# Patient Record
Sex: MALE | Race: WHITE
[De-identification: names, ages, dates, MRNs, and addresses within clinical notes are randomized per-mention and may not be internally consistent; named-entity substitution may affect disease eponyms.]

---

## 2019-08-09 PROBLEM — Z00.00 ENCOUNTER FOR PREVENTIVE HEALTH EXAMINATION: Status: ACTIVE | Noted: 2019-08-09

## 2019-08-22 ENCOUNTER — RECORD ABSTRACTING (OUTPATIENT)
Age: 54
End: 2019-08-22

## 2019-08-22 DIAGNOSIS — E78.5 HYPERLIPIDEMIA, UNSPECIFIED: ICD-10-CM

## 2019-08-22 DIAGNOSIS — Z87.39 PERSONAL HISTORY OF OTHER DISEASES OF THE MUSCULOSKELETAL SYSTEM AND CONNECTIVE TISSUE: ICD-10-CM

## 2019-08-22 RX ORDER — AMLODIPINE BESYLATE 5 MG/1
5 TABLET ORAL DAILY
Refills: 0 | Status: ACTIVE | COMMUNITY
Start: 2019-08-22

## 2019-08-22 RX ORDER — COLCHICINE 0.6 MG/1
0.6 TABLET ORAL DAILY
Refills: 0 | Status: ACTIVE | COMMUNITY
Start: 2019-08-22

## 2019-08-22 RX ORDER — LISINOPRIL 5 MG/1
5 TABLET ORAL DAILY
Qty: 90 | Refills: 1 | Status: ACTIVE | COMMUNITY
Start: 2019-08-22

## 2019-08-22 RX ORDER — PNV NO.95/FERROUS FUM/FOLIC AC 28MG-0.8MG
1000 TABLET ORAL
Refills: 0 | Status: ACTIVE | COMMUNITY
Start: 2019-08-22

## 2019-08-22 RX ORDER — SIMVASTATIN 20 MG/1
20 TABLET, FILM COATED ORAL
Refills: 0 | Status: ACTIVE | COMMUNITY
Start: 2019-08-22

## 2019-08-22 RX ORDER — FLUTICASONE PROPIONATE AND SALMETEROL 50; 100 UG/1; UG/1
100-50 POWDER RESPIRATORY (INHALATION)
Refills: 0 | Status: ACTIVE | COMMUNITY
Start: 2019-08-22

## 2019-08-27 ENCOUNTER — APPOINTMENT (OUTPATIENT)
Dept: HEMATOLOGY ONCOLOGY | Facility: CLINIC | Age: 54
End: 2019-08-27
Payer: COMMERCIAL

## 2019-08-27 VITALS
RESPIRATION RATE: 16 BRPM | DIASTOLIC BLOOD PRESSURE: 101 MMHG | TEMPERATURE: 98.2 F | BODY MASS INDEX: 29.3 KG/M2 | SYSTOLIC BLOOD PRESSURE: 152 MMHG | HEART RATE: 67 BPM | WEIGHT: 207 LBS | HEIGHT: 70.5 IN

## 2019-08-27 DIAGNOSIS — Z72.89 OTHER PROBLEMS RELATED TO LIFESTYLE: ICD-10-CM

## 2019-08-27 DIAGNOSIS — Z87.891 PERSONAL HISTORY OF NICOTINE DEPENDENCE: ICD-10-CM

## 2019-08-27 DIAGNOSIS — Z80.0 FAMILY HISTORY OF MALIGNANT NEOPLASM OF DIGESTIVE ORGANS: ICD-10-CM

## 2019-08-27 DIAGNOSIS — Z80.8 FAMILY HISTORY OF MALIGNANT NEOPLASM OF OTHER ORGANS OR SYSTEMS: ICD-10-CM

## 2019-08-27 DIAGNOSIS — R79.89 OTHER SPECIFIED ABNORMAL FINDINGS OF BLOOD CHEMISTRY: ICD-10-CM

## 2019-08-27 DIAGNOSIS — I10 ESSENTIAL (PRIMARY) HYPERTENSION: ICD-10-CM

## 2019-08-27 PROCEDURE — 99243 OFF/OP CNSLTJ NEW/EST LOW 30: CPT

## 2019-08-27 RX ORDER — OMEPRAZOLE 20 MG/1
20 CAPSULE, DELAYED RELEASE ORAL
Qty: 90 | Refills: 1 | Status: DISCONTINUED | COMMUNITY
Start: 2019-08-22 | End: 2019-08-27

## 2019-08-27 RX ORDER — RANITIDINE HYDROCHLORIDE 300 MG/1
300 CAPSULE ORAL
Refills: 0 | Status: DISCONTINUED | COMMUNITY
Start: 2019-08-22 | End: 2019-08-27

## 2019-08-27 NOTE — CONSULT LETTER
[Dear  ___] : Dear  [unfilled], [Please see my note below.] : Please see my note below. [( Thank you for referring [unfilled] for consultation for _____ )] : Thank you for referring [unfilled] for consultation for [unfilled] [Consult Closing:] : Thank you very much for allowing me to participate in the care of this patient.  If you have any questions, please do not hesitate to contact me. [Sincerely,] : Sincerely, [FreeTextEntry2] : Dr Armin Olmedo [FreeTextEntry3] : Faby Jane

## 2019-08-27 NOTE — REVIEW OF SYSTEMS
[Patient Intake Form Reviewed] : Patient intake form was reviewed [Negative] : Psychiatric [Fatigue] : fatigue [FreeTextEntry7] : mild reflux  [FreeTextEntry4] : mild sore throat- evaluated by ENT- PND  [FreeTextEntry9] : minor joint/ shoulder aches- stable

## 2019-08-27 NOTE — RESULTS/DATA
[FreeTextEntry1] : genetic testing for hereditary hemochromatosis- negative for C282Y and H63D mutations \par \par LFT's normal

## 2019-08-27 NOTE — HISTORY OF PRESENT ILLNESS
[de-identified] : 53 y/o male seen by PCP in July 2019 for routine physical. Blood work showed ferritin 646 with ESR 2. He was complaining of mild fatigue and arthralgias at that time and he was tested for Lyme disease- negative. He feels back to usual  now. States his joint aches are minor and he has chronic issues with shoulders ( evaluated - told that has tear ).\par \par genetic testing for mutations in HFE gene - negative \par \par He does not take iron supplements. He takes vitamin C every day . \par He drinks alcohol ( mostly beer) excessively on weekends ( several beers a day on weekends) , bit not during week work days. \par Abdominal US July 2019 showed fatty liver.\par No family hx of iron disorder.\par He does not donate  blood.

## 2019-08-27 NOTE — ASSESSMENT
[FreeTextEntry1] : 55 y/o male with mildly elevated ferritin level and borderline but still normal iron saturation. No chronic inflammatory conditions causing reactive ferritin elevation.\par Negative genetic testing for hereditary hemochromatosis.\par \par Most likely elevated ferritin due to excessive alcohol intake  +/- fatty liver. \par Discussed life style modifications. \par \par No alcohol.\par Discontinue vitamin C.\par Repeat iron studies in January 2020. \par If ferritin persistently elevated despite life style modification/ abstinence from alcohol- will obtain liver MRI ( non contrast) to evaluate hepatic iron content. If MRI shows iron overload- will need therapeutic phlebotomies.\par \par Return visit in January/ February 2020 ( after blood work). \par \par Above discussed with the patient and he is comfortable with the plan.

## 2019-08-27 NOTE — PHYSICAL EXAM
[Fully active, able to carry on all pre-disease performance without restriction] : Status 0 - Fully active, able to carry on all pre-disease performance without restriction [Normal] : affect appropriate [de-identified] : soft and nontender, small round nontender sub cutaneous nodule palpable in RLQ ( had CT abd pelvis for evaluation - August 2019- negative)